# Patient Record
Sex: MALE | Race: WHITE | HISPANIC OR LATINO | URBAN - METROPOLITAN AREA
[De-identification: names, ages, dates, MRNs, and addresses within clinical notes are randomized per-mention and may not be internally consistent; named-entity substitution may affect disease eponyms.]

---
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
## 2019-12-10 NOTE — DISCHARGE NOTE ADULT - THE PATIENT HAS
no difficulties Mercedes Flap Text: The defect edges were debeveled with a #15 scalpel blade.  Given the location of the defect, shape of the defect and the proximity to free margins a Mercedes flap was deemed most appropriate.  Using a sterile surgical marker, an appropriate advancement flap was drawn incorporating the defect and placing the expected incisions within the relaxed skin tension lines where possible. The area thus outlined was incised deep to adipose tissue with a #15 scalpel blade.  The skin margins were undermined to an appropriate distance in all directions utilizing iris scissors.

## 2020-03-31 NOTE — ED STATDOCS - MUSCULOSKELETAL, MLM
range of motion is not limited and there is no muscle tenderness. Patient range of motion is not limited and there is no muscle tenderness. 5/5 strength on flexion and extension of upper and lower limbs.

## 2020-11-14 ENCOUNTER — EMERGENCY (EMERGENCY)
Facility: HOSPITAL | Age: 20
LOS: 0 days | Discharge: HOME | End: 2020-11-14
Attending: EMERGENCY MEDICINE
Payer: MEDICAID

## 2020-11-14 VITALS — WEIGHT: 179.9 LBS | HEIGHT: 68.25 IN

## 2020-11-14 VITALS — HEART RATE: 99 BPM

## 2020-11-14 DIAGNOSIS — U07.1 COVID-19: ICD-10-CM

## 2020-11-14 DIAGNOSIS — R50.9 FEVER, UNSPECIFIED: ICD-10-CM

## 2020-11-14 DIAGNOSIS — R51.9 HEADACHE, UNSPECIFIED: ICD-10-CM

## 2020-11-14 DIAGNOSIS — Z79.4 LONG TERM (CURRENT) USE OF INSULIN: ICD-10-CM

## 2020-11-14 DIAGNOSIS — J02.9 ACUTE PHARYNGITIS, UNSPECIFIED: ICD-10-CM

## 2020-11-14 DIAGNOSIS — E11.9 TYPE 2 DIABETES MELLITUS WITHOUT COMPLICATIONS: ICD-10-CM

## 2020-11-14 DIAGNOSIS — J06.9 ACUTE UPPER RESPIRATORY INFECTION, UNSPECIFIED: ICD-10-CM

## 2020-11-14 PROCEDURE — 99283 EMERGENCY DEPT VISIT LOW MDM: CPT

## 2020-11-14 PROCEDURE — U0003: CPT

## 2020-11-14 NOTE — ED ADULT NURSE NOTE - NSIMPLEMENTINTERV_GEN_ALL_ED
Implemented All Universal Safety Interventions:  [place] to call system. Call bell, personal items and telephone within reach. Instruct patient to call for assistance. Room bathroom lighting operational. Non-slip footwear when patient is off stretcher. Physically safe environment: no spills, clutter or unnecessary equipment. Stretcher in lowest position, wheels locked, appropriate side rails in place.

## 2020-11-14 NOTE — ED ADULT TRIAGE NOTE - CHIEF COMPLAINT QUOTE
Pt presents to Er with c/o of covid symptoms.  States his dad tested positive 2 days ago.  C/O of fever, headache, runny nose and throat soreness.  Denies chest pain, SOB, NVD.

## 2020-11-14 NOTE — ED STATDOCS - CARE PLAN
Principal Discharge DX:	Screening for viral disease   Principal Discharge DX:	URI (upper respiratory infection)

## 2020-11-14 NOTE — ED STATDOCS - NSFOLLOWUPCLINICS_GEN_ALL_ED_FT
[facility]  Family Medicine  [address] [place] [address]  Phone: [phone]  Fax:   Follow Up Time:

## 2020-11-14 NOTE — ED STATDOCS - ATTENDING CONTRIBUTION TO CARE
Attending Contribution to Care: I, Cinthia Oswald, performed the initial face to face bedside interview with this patient regarding history of present illness, review of symptoms and relevant past medical, social and family history.  I completed an independent physical examination.  I was the initial provider who evaluated this patient. I have signed out the follow up of any pending tests (i.e. labs, radiological studies) to the ACP.  I have communicated the patient’s plan of care and disposition with the ACP.

## 2020-11-14 NOTE — ED STATDOCS - PATIENT PORTAL LINK FT
You can access the FollowMyHealth Patient Portal offered by [facility] by registering at the following website: [email or web address]. By joining [employer]’s FollowMyHealth portal, you will also be able to view your health information using other applications (apps) compatible with our system.

## 2020-11-15 PROBLEM — G43.909 MIGRAINE, UNSPECIFIED, NOT INTRACTABLE, WITHOUT STATUS MIGRAINOSUS: Chronic | Status: ACTIVE | Noted: 2017-11-08

## 2020-11-15 LAB — SARS-COV-2 RNA SPEC QL NAA+PROBE: DETECTED

## 2021-04-08 NOTE — ED STATDOCS - OBJECTIVE STATEMENT
Last refill omeprazole 20 mg 10/18/19 #90-0    Last OV 3/3/21    Rx and pharmacy pending per approval   18 y/o M w/ pmhx of migraines, presents to ED c/o thirst, fatigue, urinary frequency x1 week. Also c/o chest pain and abdominal pain since last night. Also states he has been getting cramps in his legs x4 days. This morning pt began vomiting. BGL in . Denies fevers, abd surgeries, or any other acute c/o. As per mother, pt has been losing a lot of weight and his urinary sx and fatigue have been worsening. Non-smoker, non-drinker, denies any other recreational drug use.

## 2021-05-23 ENCOUNTER — EMERGENCY (EMERGENCY)
Facility: HOSPITAL | Age: 21
LOS: 0 days | Discharge: HOME | End: 2021-05-23
Attending: EMERGENCY MEDICINE
Payer: MEDICAID

## 2021-05-23 VITALS
OXYGEN SATURATION: 100 % | RESPIRATION RATE: 18 BRPM | SYSTOLIC BLOOD PRESSURE: 128 MMHG | DIASTOLIC BLOOD PRESSURE: 72 MMHG | HEART RATE: 72 BPM | TEMPERATURE: 99 F

## 2021-05-23 VITALS — HEIGHT: 68.25 IN
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
## 2021-05-23 DIAGNOSIS — E10.9 TYPE 1 DIABETES MELLITUS WITHOUT COMPLICATIONS: ICD-10-CM
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
## 2021-05-23 DIAGNOSIS — M79.641 PAIN IN RIGHT HAND: ICD-10-CM

## 2021-05-23 DIAGNOSIS — S62.396A OTHER FRACTURE OF FIFTH METACARPAL BONE, RIGHT HAND, INITIAL ENCOUNTER FOR CLOSED FRACTURE: ICD-10-CM

## 2021-05-23 DIAGNOSIS — S69.81XA OTHER SPECIFIED INJURIES OF RIGHT WRIST, HAND AND FINGER(S), INITIAL ENCOUNTER: ICD-10-CM
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
## 2021-05-23 PROCEDURE — 99283 EMERGENCY DEPT VISIT LOW MDM: CPT

## 2021-05-23 PROCEDURE — 73130 X-RAY EXAM OF HAND: CPT | Mod: 26,RT

## 2021-05-23 PROCEDURE — 73130 X-RAY EXAM OF HAND: CPT | Mod: RT

## 2021-05-23 RX ORDER — IBUPROFEN 200 MG
800 TABLET ORAL ONCE
Refills: 0 | Status: COMPLETED | OUTPATIENT
Start: 2021-05-23 | End: 2021-05-23

## 2021-05-23 RX ADMIN — Medication 800 MILLIGRAM(S): at 01:53

## 2021-05-23 NOTE — ED ADULT TRIAGE NOTE - CHIEF COMPLAINT QUOTE
Pt presents to ED for right hand pain sp hitting wall. Pt with limited ROM to hand. Swelling noted in ED

## 2021-05-23 NOTE — ED PROVIDER NOTE - MUSCULOSKELETAL, MLM
Spine appears normal, range of motion is limited with difficulty making fist with right hand, +5th metacarpal tenderness of right hand and swelling over dorsum of right hand over 4th and 5th metacarpals;

## 2021-05-23 NOTE — ED PROVIDER NOTE - PATIENT PORTAL LINK FT
You can access the FollowMyHealth Patient Portal offered by [facility] by registering at the following website: [email or web address]. By joining [employer]’s FollowMyHealth portal, you will also be able to view your health information using other applications (apps) compatible with our system.

## 2021-05-23 NOTE — ED PROVIDER NOTE - OBJECTIVE STATEMENT
Pt. is a 19 yo right hand dominant M with a hx of migraines and DM on insulin presenting with right hand pain s/p punching something about 2 hours ago.  Patient states he is now having pain on dorsum of right hand and some swelling. He is having trouble moving his hand and grasping things.  He denies other injury, weakness or numbness.

## 2021-05-24 ENCOUNTER — EMERGENCY (EMERGENCY)
Facility: HOSPITAL | Age: 21
LOS: 0 days | Discharge: HOME | End: 2021-05-24
Attending: HOSPITALIST
Payer: MEDICAID

## 2021-05-24 VITALS — WEIGHT: 169.98 LBS | HEIGHT: 69 IN

## 2021-05-24 VITALS
SYSTOLIC BLOOD PRESSURE: 119 MMHG | RESPIRATION RATE: 18 BRPM | OXYGEN SATURATION: 100 % | HEART RATE: 51 BPM | DIASTOLIC BLOOD PRESSURE: 70 MMHG | TEMPERATURE: 98 F

## 2021-05-24 DIAGNOSIS — E10.9 TYPE 1 DIABETES MELLITUS WITHOUT COMPLICATIONS: ICD-10-CM

## 2021-05-24 DIAGNOSIS — Z86.69 PERSONAL HISTORY OF OTHER DISEASES OF THE NERVOUS SYSTEM AND SENSE ORGANS: ICD-10-CM

## 2021-05-24 DIAGNOSIS — M79.641 PAIN IN RIGHT HAND: ICD-10-CM

## 2021-05-24 DIAGNOSIS — S62.306A UNSPECIFIED FRACTURE OF FIFTH METACARPAL BONE, RIGHT HAND, INITIAL ENCOUNTER FOR CLOSED FRACTURE: ICD-10-CM
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
## 2021-05-24 DIAGNOSIS — Z79.4 LONG TERM (CURRENT) USE OF INSULIN: ICD-10-CM
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
## 2021-05-24 PROCEDURE — 99284 EMERGENCY DEPT VISIT MOD MDM: CPT | Mod: 25

## 2021-05-24 PROCEDURE — 29125 APPL SHORT ARM SPLINT STATIC: CPT | Mod: RT

## 2021-05-24 PROCEDURE — 99283 EMERGENCY DEPT VISIT LOW MDM: CPT | Mod: 25

## 2021-05-24 RX ORDER — IBUPROFEN 200 MG
600 TABLET ORAL ONCE
Refills: 0 | Status: COMPLETED | OUTPATIENT
Start: 2021-05-24 | End: 2021-05-24

## 2021-05-24 RX ADMIN — Medication 600 MILLIGRAM(S): at 17:40

## 2021-05-24 NOTE — ED STATDOCS - CARE PROVIDER_API CALL
Royer Vega)  Orthopaedic Surgery; Surgery of the Hand  [address] [place] [address]  Phone: [phone]  Fax: [phone]  Follow Up Time:     Ady Shin)  Orthopaedic Surgery; Surgery of the Hand  [address], [address]  [place] [address]  Phone: [phone]  Fax: [phone]  Follow Up Time:

## 2021-05-24 NOTE — ED STATDOCS - NS_ ATTENDINGSCRIBEDETAILS _ED_A_ED_FT
Lili Lee MD: The history, relevant review of systems, past medical and surgical history, medical decision making, and physical examination was documented by the scribe in my presence and I attest to the accuracy of the documentation.

## 2021-05-24 NOTE — ED POST DISCHARGE NOTE - DETAILS
Unable to reach patient. Will send letter via [employer]. - Giovanni Schafer PA-C Patient returned call, findings provided. Advised to return to ED for splint. Pt states he will return after work today. - Giovanni Schafer PA-C

## 2021-05-24 NOTE — ED ADULT NURSE NOTE - OBJECTIVE STATEMENT
Patient c/o right hand pain. Pt was seen at [facility] 2 days ago s/p punching a wall with right hand. Had XR and was told was negative and was Dx with a contusion and had hand wrapped in ACE wrap and was discharged home. Pt states was called by hospital today and told may have a right hand fracture and came to the ED. Right hand dominant. No other complaints at this time.

## 2021-05-24 NOTE — ED STATDOCS - PROGRESS NOTE DETAILS
Procedure note documented. Mtang[place]i Np 20 yr. old male presents to ED with fracture of 5th metacarpal after hitting a wall 2 days ago. Seen at ED 2 days ago and called back for revised reading of hand X-Ray. Seen and examined by attending in intake. Plan: Splint ulna gutter and refer to orthopedic. Will F/U with patient. Dalia DON

## 2021-05-24 NOTE — ED STATDOCS - OBJECTIVE STATEMENT
19 y/o male with a PMHx of migraines, DM, presents to the ED c/o right hand pain. Pt was seen at [facility] 2 days ago s/p punching a wall with right hand. Had XR and was told was negative and was Dx with a contusion and had hand wrapped in ACE wrap and was discharged home. Pt states was called by hospital today and told may have a right hand fracture and came to the ED. Right hand dominant. No other complaints at this time.

## 2021-05-24 NOTE — ED STATDOCS - NSFOLLOWUPINSTRUCTIONS_ED_ALL_ED_FT
Boxer's Fracture       A boxer's fracture is a break (fracture) in the bone that connects your little finger to your wrist (fifth metacarpal). This type of fracture usually happens at the end of the bone, closest to the little finger. The knuckle is often pushed down by the impact.      What are the causes?  This condition may be caused by:  •Hitting an object with a clenched fist.      •A hard, direct hit to the hand.      •An injury that crushes the hand.        What increases the risk?  You are more likely to develop this condition if:  •You are in a fistfight.      •You have certain bone diseases, such as osteoporosis.        What are the signs or symptoms?  Symptoms of a boxer's fracture develop immediately after the injury. They may include:  •Pain, which may get worse when your little finger is moved or your hand is touched.      •Bruising around the knuckle.      •Swelling of your hand.      •Your little finger being in an abnormal position.      •The knuckle on the finger looking sunken in.      •Not being able to move the finger.      •A shortened finger.        How is this diagnosed?  This injury may be diagnosed based on:  •Your symptoms. Your health care provider may ask about any recent hand injury.      •A physical exam.      •X-rays to confirm the diagnosis.        How is this treated?  Treatment for this condition depends on how severe the injury is. Possible treatments include:  •Closed reduction. This treatment may be used if your bone is stable. The health care provider uses pressure and rotation to move the bones back into place without cutting your skin open. You would need to wear a cast or splint to help keep your bones in place while they heal.      •Open reduction with internal fixation (ORIF). This may be needed if your fracture is far out of place or goes through the joint surface of the bone or through the skin. This treatment involves open surgery to move your bones back into the right position. Screws, wires, or plates may be used to make the fracture stable.    You may also need to:  •Wear a cast or a splint for several weeks.       •Have follow-up X-rays to make sure that the bone is healing well and staying in position.       •Have physical therapy after your cast or splint is removed. This will help you regain full movement (range of motion) and strength in your hand.        Follow these instructions at home:    If you have a splint:     •Wear the splint as told by your health care provider. Remove it only as told by your health care provider.       •Loosen the splint if your fingers tingle, become numb, or turn cold and blue.      •Keep the splint clean.     •If the splint is not waterproof:  •Do not let it get wet.      •Cover it with a watertight covering when you take a bath or a shower.        If you have a cast:     • Do not stick anything inside the cast to scratch your skin. Doing that increases your risk of infection.      •Check the skin around the cast every day. Tell your health care provider about any concerns.      •You may put lotion on dry skin around the edges of the cast. Do not put lotion on the skin underneath the cast.      •Keep the cast clean.     •If the cast is not waterproof:   •Do not let it get wet.      •Cover it with a watertight covering when you take a bath or a shower.        Managing pain, stiffness, and swelling   •If directed, put ice on the injured area.  •If you have a removable splint, remove it as told by your health care provider.      •Put ice in a plastic bag.      •Place a towel between your skin and the bag or between your cast and the bag.       •Leave the ice on for 20 minutes, 2–3 times a day.        •Move your fingers often to avoid stiffness and to lessen swelling.      •Raise (elevate) the injured hand above the level of your heart while you are sitting or lying down.      Driving     • Do not drive or use heavy machinery while taking prescription pain medicine.      • Do not drive while wearing a cast or splint on your hand.      General instructions     • Do not put pressure on any part of the cast or splint until it is fully hardened. This may take several hours.      • Do not use any products that contain nicotine or tobacco, such as cigarettes and e-cigarettes. These can delay bone healing. If you need help quitting, ask your health care provider.      •Take over-the-counter and prescription medicines only as told by your health care provider.    •If you are taking prescription pain medicine, take actions to prevent or treat constipation. Your health care provider may recommend that you:  •Drink enough fluid to keep your urine pale yellow.       •Eat foods that are high in fiber, such as fresh fruits and vegetables, whole grains, and beans.       •Limit foods that are high in fat and processed sugars, such as fried or sweet foods.       •Take an over-the-counter or prescription medicine for constipation.        •Return to your normal activities as told by your health care provider. Ask your health care provider what activities are safe for you.      •Keep all follow-up visits as told by your health care provider. This is important.        Contact a health care provider if:    •Your pain is getting worse.      •You have more redness, swelling, or pain in the injured area.      •You have a fever.      •Your cast or splint feels too tight or too loose.      •Your cast is coming apart.        Get help right away if:    •You have trouble breathing.      •You have severe pain.      •Your skin or nails on your injured hand turn blue or gray even after you loosen your splint.      •Your injured hand feels cold or becomes numb even after you loosen your splint.      •You develop a rash.        Summary    •A boxer's fracture is a break in the bone that connects your little finger to your wrist (fifth metacarpal).      •You may have an X-ray to confirm the diagnosis.      •Treatment depends on how severe your injury is, and it may include wearing a cast or splint or having surgery.      This information is not intended to replace advice given to you by your health care provider. Make sure you discuss any questions you have with your health care provider.    Rest. Ice compresses and elevate.  Maintain splint until you see a Orthopedic.   Ibuprofen 600 mg. PO every 6 hrs. for pain.  Follow up with Orthopedic.

## 2021-05-24 NOTE — ED ADULT TRIAGE NOTE - CHIEF COMPLAINT QUOTE
Pt a/ox3, reports "I thought I broke my right hand so I came hereon Saturday and they said it was a sprain, and then they sent me home and called me back to tell me they think it is a fracture and that I should come back". pt reports right hand injury s/p punching a wall on Saturday.

## 2021-05-24 NOTE — ED STATDOCS - PATIENT PORTAL LINK FT
You can access the FollowMyHealth Patient Portal offered by [facility] by registering at the following website: [email or web address]. By joining [employer]’s FollowMyHealth portal, you will also be able to view your health information using other applications (apps) compatible with our system.

## 2021-05-25 PROBLEM — E10.9 TYPE 1 DIABETES MELLITUS WITHOUT COMPLICATIONS: Chronic | Status: ACTIVE | Noted: 2021-05-23

## 2021-12-20 ENCOUNTER — EMERGENCY (EMERGENCY)
Facility: HOSPITAL | Age: 21
LOS: 0 days | Discharge: HOME | End: 2021-12-20
Attending: EMERGENCY MEDICINE
Payer: MEDICAID

## 2021-12-20 VITALS
DIASTOLIC BLOOD PRESSURE: 72 MMHG | SYSTOLIC BLOOD PRESSURE: 134 MMHG | OXYGEN SATURATION: 99 % | TEMPERATURE: 98 F | HEART RATE: 72 BPM | RESPIRATION RATE: 17 BRPM

## 2021-12-20 VITALS — HEIGHT: 69 IN | WEIGHT: 179.9 LBS

## 2021-12-20 DIAGNOSIS — S76.811A STRAIN OF OTHER SPECIFIED MUSCLES, FASCIA AND TENDONS AT THIGH LEVEL, RIGHT THIGH, INITIAL ENCOUNTER: ICD-10-CM
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
## 2021-12-20 DIAGNOSIS — N50.819 TESTICULAR PAIN, UNSPECIFIED: ICD-10-CM

## 2021-12-20 DIAGNOSIS — R10.32 LEFT LOWER QUADRANT PAIN: ICD-10-CM

## 2021-12-20 DIAGNOSIS — E10.65 TYPE 1 DIABETES MELLITUS WITH HYPERGLYCEMIA: ICD-10-CM

## 2021-12-20 DIAGNOSIS — Z79.4 LONG TERM (CURRENT) USE OF INSULIN: ICD-10-CM

## 2021-12-20 LAB
ALBUMIN SERPL ELPH-MCNC: 3.9 G/DL — SIGNIFICANT CHANGE UP (ref 3.3–5)
ALP SERPL-CCNC: 141 U/L — HIGH (ref 40–120)
ALT FLD-CCNC: 16 U/L — SIGNIFICANT CHANGE UP (ref 12–78)
ANION GAP SERPL CALC-SCNC: 3 MMOL/L — LOW (ref 5–17)
APPEARANCE UR: CLEAR — SIGNIFICANT CHANGE UP
AST SERPL-CCNC: 16 U/L — SIGNIFICANT CHANGE UP (ref 15–37)
BASOPHILS # BLD AUTO: 0.02 K/UL — SIGNIFICANT CHANGE UP (ref 0–0.2)
BASOPHILS NFR BLD AUTO: 0.3 % — SIGNIFICANT CHANGE UP (ref 0–2)
BILIRUB SERPL-MCNC: 0.4 MG/DL — SIGNIFICANT CHANGE UP (ref 0.2–1.2)
BILIRUB UR-MCNC: NEGATIVE — SIGNIFICANT CHANGE UP
BUN SERPL-MCNC: 17 MG/DL — SIGNIFICANT CHANGE UP (ref 7–23)
CALCIUM SERPL-MCNC: 9.3 MG/DL — SIGNIFICANT CHANGE UP (ref 8.5–10.1)
CHLORIDE SERPL-SCNC: 100 MMOL/L — SIGNIFICANT CHANGE UP (ref 96–108)
CO2 SERPL-SCNC: 29 MMOL/L — SIGNIFICANT CHANGE UP (ref 22–31)
COLOR SPEC: YELLOW — SIGNIFICANT CHANGE UP
CREAT SERPL-MCNC: 1.29 MG/DL — SIGNIFICANT CHANGE UP (ref 0.5–1.3)
DIFF PNL FLD: NEGATIVE — SIGNIFICANT CHANGE UP
EOSINOPHIL # BLD AUTO: 0.04 K/UL — SIGNIFICANT CHANGE UP (ref 0–0.5)
EOSINOPHIL NFR BLD AUTO: 0.7 % — SIGNIFICANT CHANGE UP (ref 0–6)
GLUCOSE SERPL-MCNC: 530 MG/DL — CRITICAL HIGH (ref 70–99)
GLUCOSE UR QL: 1000 MG/DL
HCT VFR BLD CALC: 44.8 % — SIGNIFICANT CHANGE UP (ref 39–50)
HGB BLD-MCNC: 16.1 G/DL — SIGNIFICANT CHANGE UP (ref 13–17)
IMM GRANULOCYTES NFR BLD AUTO: 0.3 % — SIGNIFICANT CHANGE UP (ref 0–1.5)
KETONES UR-MCNC: NEGATIVE — SIGNIFICANT CHANGE UP
LEUKOCYTE ESTERASE UR-ACNC: NEGATIVE — SIGNIFICANT CHANGE UP
LYMPHOCYTES # BLD AUTO: 2.67 K/UL — SIGNIFICANT CHANGE UP (ref 1–3.3)
LYMPHOCYTES # BLD AUTO: 45.7 % — HIGH (ref 13–44)
MCHC RBC-ENTMCNC: 32.1 PG — SIGNIFICANT CHANGE UP (ref 27–34)
MCHC RBC-ENTMCNC: 35.9 GM/DL — SIGNIFICANT CHANGE UP (ref 32–36)
MCV RBC AUTO: 89.4 FL — SIGNIFICANT CHANGE UP (ref 80–100)
MONOCYTES # BLD AUTO: 0.31 K/UL — SIGNIFICANT CHANGE UP (ref 0–0.9)
MONOCYTES NFR BLD AUTO: 5.3 % — SIGNIFICANT CHANGE UP (ref 2–14)
NEUTROPHILS # BLD AUTO: 2.78 K/UL — SIGNIFICANT CHANGE UP (ref 1.8–7.4)
NEUTROPHILS NFR BLD AUTO: 47.7 % — SIGNIFICANT CHANGE UP (ref 43–77)
NITRITE UR-MCNC: NEGATIVE — SIGNIFICANT CHANGE UP
PH UR: 6.5 — SIGNIFICANT CHANGE UP (ref 5–8)
PLATELET # BLD AUTO: 239 K/UL — SIGNIFICANT CHANGE UP (ref 150–400)
POTASSIUM SERPL-MCNC: 4.3 MMOL/L — SIGNIFICANT CHANGE UP (ref 3.5–5.3)
POTASSIUM SERPL-SCNC: 4.3 MMOL/L — SIGNIFICANT CHANGE UP (ref 3.5–5.3)
PROT SERPL-MCNC: 7.7 GM/DL — SIGNIFICANT CHANGE UP (ref 6–8.3)
PROT UR-MCNC: NEGATIVE MG/DL — SIGNIFICANT CHANGE UP
RBC # BLD: 5.01 M/UL — SIGNIFICANT CHANGE UP (ref 4.2–5.8)
RBC # FLD: 11.6 % — SIGNIFICANT CHANGE UP (ref 10.3–14.5)
SODIUM SERPL-SCNC: 132 MMOL/L — LOW (ref 135–145)
SP GR SPEC: 1.01 — SIGNIFICANT CHANGE UP (ref 1.01–1.02)
UROBILINOGEN FLD QL: NEGATIVE MG/DL — SIGNIFICANT CHANGE UP
WBC # BLD: 5.84 K/UL — SIGNIFICANT CHANGE UP (ref 3.8–10.5)
WBC # FLD AUTO: 5.84 K/UL — SIGNIFICANT CHANGE UP (ref 3.8–10.5)

## 2021-12-20 PROCEDURE — 99284 EMERGENCY DEPT VISIT MOD MDM: CPT | Mod: 25

## 2021-12-20 PROCEDURE — 76870 US EXAM SCROTUM: CPT

## 2021-12-20 PROCEDURE — 93975 VASCULAR STUDY: CPT | Mod: 26

## 2021-12-20 PROCEDURE — 96374 THER/PROPH/DIAG INJ IV PUSH: CPT

## 2021-12-20 PROCEDURE — 99285 EMERGENCY DEPT VISIT HI MDM: CPT

## 2021-12-20 PROCEDURE — 74177 CT ABD & PELVIS W/CONTRAST: CPT | Mod: 26,MA

## 2021-12-20 PROCEDURE — 85025 COMPLETE CBC W/AUTO DIFF WBC: CPT

## 2021-12-20 PROCEDURE — 82962 GLUCOSE BLOOD TEST: CPT

## 2021-12-20 PROCEDURE — 96375 TX/PRO/DX INJ NEW DRUG ADDON: CPT

## 2021-12-20 PROCEDURE — 80053 COMPREHEN METABOLIC PANEL: CPT

## 2021-12-20 PROCEDURE — 36415 COLL VENOUS BLD VENIPUNCTURE: CPT

## 2021-12-20 PROCEDURE — 76870 US EXAM SCROTUM: CPT | Mod: 26

## 2021-12-20 PROCEDURE — 81003 URINALYSIS AUTO W/O SCOPE: CPT

## 2021-12-20 PROCEDURE — 74177 CT ABD & PELVIS W/CONTRAST: CPT | Mod: MA

## 2021-12-20 PROCEDURE — 93975 VASCULAR STUDY: CPT

## 2021-12-20 RX ORDER — INSULIN LISPRO 100/ML
6 VIAL (ML) SUBCUTANEOUS ONCE
Refills: 0 | Status: COMPLETED | OUTPATIENT
Start: 2021-12-20 | End: 2021-12-20

## 2021-12-20 RX ORDER — IBUPROFEN 200 MG
1 TABLET ORAL
Qty: 12 | Refills: 0
Start: 2021-12-20

## 2021-12-20 RX ORDER — INSULIN HUMAN 100 [IU]/ML
8 INJECTION, SOLUTION SUBCUTANEOUS ONCE
Refills: 0 | Status: DISCONTINUED | OUTPATIENT
Start: 2021-12-20 | End: 2021-12-20

## 2021-12-20 RX ORDER — INSULIN HUMAN 100 [IU]/ML
4 INJECTION, SOLUTION SUBCUTANEOUS ONCE
Refills: 0 | Status: COMPLETED | OUTPATIENT
Start: 2021-12-20 | End: 2021-12-20

## 2021-12-20 RX ORDER — KETOROLAC TROMETHAMINE 30 MG/ML
30 SYRINGE (ML) INJECTION ONCE
Refills: 0 | Status: DISCONTINUED | OUTPATIENT
Start: 2021-12-20 | End: 2021-12-20

## 2021-12-20 RX ORDER — SODIUM CHLORIDE 9 MG/ML
1000 INJECTION INTRAMUSCULAR; INTRAVENOUS; SUBCUTANEOUS ONCE
Refills: 0 | Status: COMPLETED | OUTPATIENT
Start: 2021-12-20 | End: 2021-12-20

## 2021-12-20 RX ADMIN — Medication 30 MILLIGRAM(S): at 04:10

## 2021-12-20 RX ADMIN — INSULIN HUMAN 4 UNIT(S): 100 INJECTION, SOLUTION SUBCUTANEOUS at 06:17

## 2021-12-20 RX ADMIN — Medication 30 MILLIGRAM(S): at 03:37

## 2021-12-20 RX ADMIN — SODIUM CHLORIDE 1000 MILLILITER(S): 9 INJECTION INTRAMUSCULAR; INTRAVENOUS; SUBCUTANEOUS at 06:17

## 2021-12-20 NOTE — ED ADULT TRIAGE NOTE - CHIEF COMPLAINT QUOTE
patient c/o worsening groin pain x 1 week.  reports pain radiates from left testicle up to left groin area.  notes heavy lifting in gym recently.  denies urinary sx.

## 2021-12-20 NOTE — ED PROVIDER NOTE - CARE PLAN
1 Principal Discharge DX:	Strain of groin, left, initial encounter  Secondary Diagnosis:	Diabetes mellitus with hyperglycemia

## 2021-12-20 NOTE — ED PROVIDER NOTE - GASTROINTESTINAL, MLM
Abdomen soft, non-tender, no guarding.  +tenderness without hernia palpated along left inguinal ligament.

## 2021-12-20 NOTE — ED PROVIDER NOTE - PATIENT PORTAL LINK FT
You can access the FollowMyHealth Patient Portal offered by [facility] by registering at the following website: [email or web address]. By joining [email or web address]’s FollowMyHealth portal, you will also be able to view your health information using other applications (apps) compatible with our system. You can access the FollowMyHealth Patient Portal offered by [facility] by registering at the following website: [email or web address]. By joining [employer]’s FollowMyHealth portal, you will also be able to view your health information using other applications (apps) compatible with our system.

## 2021-12-20 NOTE — ED PROVIDER NOTE - PROGRESS NOTE DETAILS
Groin strain/pulled muscle likely.  Ibuprofen 800 sent to pharmacy.  Patient to follow up with his doctor.  Understands he had hyperglycemia and will check glucose at home and continue to cover with insulin.  Patient will rest and take NSAIDs for pain. Patient still with glucose in 400s.  WIll give 1L NSS and regular insulin IV 4u Attending [place], received sign out from Dr. Oswald.  Pt BS improved to 319.  d/w pt importance to follow up with endocrine today (pt has appt for mid jan) and to take medications as prescribed.  Follow up with PMD for groin pan

## 2021-12-20 NOTE — ED PROVIDER NOTE - OBJECTIVE STATEMENT
Patient is a 20 yo M with PMHx of DM presents with left groin pain X 1 week.  Patient states he has been going to the gym a lot and doesn't know if he strained his groin.  States pain is worse when standing or walking. Denies dysuria, hematuria or penile discharge.  Having normal bowel movements.  No fever.  No vomiting.  Patient had similar pain years ago after heavy lifting.  Worried he has a hernia.  Pain is mostly in left groin and upper testicular region.  No direct trauma to groin or testicles.

## 2021-12-20 NOTE — ED PROVIDER NOTE - NSFOLLOWUPINSTRUCTIONS_ED_ALL_ED_FT
Muscle Strain    WHAT YOU NEED TO KNOW:    A muscle strain is a twist, pull, or tear of a muscle or tendon. A tendon is a strong elastic tissue that connects a muscle to a bone. Signs of a strained muscle include bruising and swelling over the area, pain with movement, and loss of strength.          DISCHARGE INSTRUCTIONS:    Return to the emergency department if:     You suddenly cannot feel or move your injured muscle.        Contact your healthcare provider if:     Your pain and swelling worsen or do not go away.       You have questions or concerns about your condition or care.    Medicines:     NSAIDs help decrease swelling and pain or fever. This medicine is available with or without a doctor's order. NSAIDs can cause stomach bleeding or kidney problems in certain people. If you take blood thinner medicine, always ask your healthcare provider if NSAIDs are safe for you. Always read the medicine label and follow directions.      Muscle relaxers help decrease pain and muscle spasms.      Take your medicine as directed. Contact your healthcare provider if you think your medicine is not helping or if you have side effects. Tell him of her if you are allergic to any medicine. Keep a list of the medicines, vitamins, and herbs you take. Include the amounts, and when and why you take them. Bring the list or the pill bottles to follow-up visits. Carry your medicine list with you in case of an emergency.    Follow up with your healthcare provider as directed: Your healthcare provider may suggest that you have a follow-up visit before you go back to your usual activity. Write down your questions so you remember to ask them during your visits.    Self-care:     3 to 7 days after the injury: Use Rest, Ice, Compression, and Elevation (RICE) to help stop bruising and decrease pain and swelling.  Rest: Rest your muscle to allow your injury to heal. When the pain decreases, begin normal, slow movements. For mild and moderate muscle strains, you should rest your muscles for about 2 days. However, if you have a severe muscle strain, you should rest for 10 to 14 days. You may need to use crutches to walk if your muscle strain is in your legs or lower body.       Ice: Put an ice pack on the injured area. Put a towel between the ice pack and your skin. Do not put the ice pack directly on your skin. You can use a package of frozen peas instead of an ice pack.      Compression: You may need to wrap an elastic bandage around the area to decrease swelling. It should be tight enough for you to feel support. Do not wrap it too tightly.       Elevation: Keep the injured muscle raised above your heart if possible. For example if you have a strain of your lower leg muscle, lie down and prop your leg up on pillows. This helps decrease pain and swelling.      3 to 21 days after the injury: Start to slowly and regularly exercise your muscle. This will help it heal. If you feel pain, decrease how hard you are exercising.       1 to 6 weeks after the injury: Stretch the injured muscle. Hold the stretch for about 30 seconds. Do this 4 times a day. You may stretch the muscle until you feel a slight pull. Stop stretching if you feel pain.       2 weeks to 6 months after the injury: The goal of this phase is to return to the activity you were doing before the injury happened, without hurting the muscle again.       3 weeks to 6 months after the injury: Keep stretching and strengthening your muscles to avoid injury. Slowly increase the time and distance that you exercise. You may have signs and symptoms of muscle strain 6 months after the injury, even if you do things to help it heal. In this case, you may need surgery on the muscle. Please call and follow up with your endocrinologist and primary care doctor in 1-3 days.  Please call [phone] to find doctors in [facility].    Use Tylenol (acetaminophen) 1000mg every 6 hours and Motrin (Ibuprofen) 600 mg every 6 hours as need for fever/pain.      Muscle Strain    WHAT YOU NEED TO KNOW:    A muscle strain is a twist, pull, or tear of a muscle or tendon. A tendon is a strong elastic tissue that connects a muscle to a bone. Signs of a strained muscle include bruising and swelling over the area, pain with movement, and loss of strength.          DISCHARGE INSTRUCTIONS:    Return to the emergency department if:     You suddenly cannot feel or move your injured muscle.        Contact your healthcare provider if:     Your pain and swelling worsen or do not go away.       You have questions or concerns about your condition or care.    Medicines:     NSAIDs help decrease swelling and pain or fever. This medicine is available with or without a doctor's order. NSAIDs can cause stomach bleeding or kidney problems in certain people. If you take blood thinner medicine, always ask your healthcare provider if NSAIDs are safe for you. Always read the medicine label and follow directions.      Muscle relaxers help decrease pain and muscle spasms.      Take your medicine as directed. Contact your healthcare provider if you think your medicine is not helping or if you have side effects. Tell him of her if you are allergic to any medicine. Keep a list of the medicines, vitamins, and herbs you take. Include the amounts, and when and why you take them. Bring the list or the pill bottles to follow-up visits. Carry your medicine list with you in case of an emergency.    Follow up with your healthcare provider as directed: Your healthcare provider may suggest that you have a follow-up visit before you go back to your usual activity. Write down your questions so you remember to ask them during your visits.    Self-care:     3 to 7 days after the injury: Use Rest, Ice, Compression, and Elevation (RICE) to help stop bruising and decrease pain and swelling.  Rest: Rest your muscle to allow your injury to heal. When the pain decreases, begin normal, slow movements. For mild and moderate muscle strains, you should rest your muscles for about 2 days. However, if you have a severe muscle strain, you should rest for 10 to 14 days. You may need to use crutches to walk if your muscle strain is in your legs or lower body.       Ice: Put an ice pack on the injured area. Put a towel between the ice pack and your skin. Do not put the ice pack directly on your skin. You can use a package of frozen peas instead of an ice pack.      Compression: You may need to wrap an elastic bandage around the area to decrease swelling. It should be tight enough for you to feel support. Do not wrap it too tightly.       Elevation: Keep the injured muscle raised above your heart if possible. For example if you have a strain of your lower leg muscle, lie down and prop your leg up on pillows. This helps decrease pain and swelling.      3 to 21 days after the injury: Start to slowly and regularly exercise your muscle. This will help it heal. If you feel pain, decrease how hard you are exercising.       1 to 6 weeks after the injury: Stretch the injured muscle. Hold the stretch for about 30 seconds. Do this 4 times a day. You may stretch the muscle until you feel a slight pull. Stop stretching if you feel pain.       2 weeks to 6 months after the injury: The goal of this phase is to return to the activity you were doing before the injury happened, without hurting the muscle again.       3 weeks to 6 months after the injury: Keep stretching and strengthening your muscles to avoid injury. Slowly increase the time and distance that you exercise. You may have signs and symptoms of muscle strain 6 months after the injury, even if you do things to help it heal. In this case, you may need surgery on the muscle.      Diabetic Hyperglycemia    WHAT YOU NEED TO KNOW:    What is diabetic hyperglycemia? Diabetic hyperglycemia is a blood glucose (sugar) level that is higher than your diabetes care team provider recommends. You may not have any signs and symptoms. You may have increased thirst and urinate more often than usual.     What increases my risk for diabetic hyperglycemia?   •You do not follow your meal plan.       •You exercise less than usual.       •You do not take your insulin or diabetes medicine as directed.       •You have an illness, such as a cold, the flu, or pneumonia.       •You have an increased amount of stress.      Why is it important to manage diabetic hyperglycemia? Over time, hyperglycemia can damage your nerves, blood vessels, tissues, and organs. Damage to arteries may increase your risk for heart attack and stroke. Nerve damage may also lead to other heart, stomach, and nerve problems. If diabetic hyperglycemia is not controlled, it can lead to diabetic ketoacidosis (DKA) or hyperglycemic hyperosmolar state (HHS). These are serious conditions that can become life-threatening.    How do I manage diabetic hyperglycemia?   •If you take diabetes medicine or insulin, take it as directed. Missed or wrong doses can cause your blood sugar to go up.       •Tell your care team provider if you continue to have trouble managing your blood sugar. He or she may change the type, amount, or timing of your diabetes medicine or insulin. If you do not take diabetes medicine or insulin, you may need to start.      •Work with your care team provider to develop a sick day plan. Illness can cause your blood sugar to rise. A sick day plan helps you control your blood sugar level when you are sick.       How do I prevent diabetic hyperglycemia?   •Check your blood sugar levels regularly. Ask your care team provider how often to check your blood sugar and what your levels should be.       •Follow your meal plan. Your blood sugar can go up if you eat a large meal or you eat more carbohydrates than recommended. Work with a dietitian to develop a meal plan that is right for you.       •Get physical activity as directed. Physical activity, such as exercise, can help lower your blood sugar when it is high. It also can keep your blood sugar levels steady over time. Be active for at least 30 minutes, 5 days a week. Include muscle strengthening activities 2 days each week. Do not sit for longer than 30 minutes at a time. Work with your care team provider to create an activity plan. Children should get at least 60 minutes of physical activity each day.       •Check your ketones before exercise if your blood sugar level is above 240 mg/dL. Do not exercise if you have ketones in your urine because your blood sugar level may rise even more. Ask your care team provider how to lower your blood sugar when you have ketones.       Call your local emergency number (911 in the [place]) for any of the following:   •You have a seizure.       •You begin to breathe fast or are short of breath.       •You become weak and confused.       When should I seek immediate care?   •Your blood sugar level is over 240 mg/dL and you have ketones in your urine.      •Your breath smells fruity.       •You have nausea and are vomiting.       •You have symptoms of dehydration, such as dark yellow urine, dry mouth and lips, and dry skin.       When should I call my care team provider?   •You continue to have higher blood sugar levels than your care team provider recommends.      •You have questions or concerns about your condition or care.       CARE AGREEMENT:    You have the right to help plan your care. Learn about your health condition and how it may be treated. Discuss treatment options with your healthcare providers to decide what care you want to receive. You always have the right to refuse treatment.

## 2022-01-12 NOTE — ED PROCEDURE NOTE - CPROC ED POST PROC CARE GUIDE1
Verbal/written post procedure instructions were given to patient/caregiver./Elevate the injured extremity as instructed. [FreeTextEntry1] : see assessment.\par patient to have all reports sent to our office for review and record keeping.\par notify office if worsening/persistent symptoms or new onset complaints/concerns, in the event of any emergency or life threatening condition, go to the nearest emergency department and then notify office. \par patient verbalized understanding and agrees with plan of care.

## 2022-02-11 NOTE — ED ADULT NURSE NOTE - OBJECTIVE STATEMENT
Future appt:    Last Appointment with provider:   10/5/2021  Last appointment at [facility]:  11/18/2021 with shayla for [address]  Cholesterol, Total (mg/dL)   Date Value   05/27/2021 185     HDL Cholesterol (mg/dL)   Date Value   05/27/2021 55     LDL Cholesterol (mg/dL)   Date Value   05/27/2021 95     Triglycerides (mg/dL)   Date Value   05/27/2021 176 (H)     Lab Results   Component Value Date     07/21/2020    A1C 5.2 07/21/2020     Lab Results   Component Value Date    T4F 1.0 06/03/2020    TSH 0.749 01/08/2021       No follow-ups on file.   Last fill 10/5/21 Pt A&Ox4, presents to the ED c/o left groin pain x 1 week. Pt thinks he may have a strain after doing pull ups with a weighted belt at the gym, but did not feel pain until the next day. Pt denies swelling or difficulty urinating. Last took Tylenol yesterday.

## 2023-02-24 NOTE — ED ADULT NURSE NOTE - PRIMARY CARE PROVIDER
unknown
on the discharge service for the patient. I have reviewed and made amendments to the documentation where necessary.

## 2023-04-26 NOTE — ED STATDOCS - CHPI ED SEVERITY
MILD Tazorac Pregnancy And Lactation Text: This medication is not safe during pregnancy. It is unknown if this medication is excreted in breast milk.

## 2023-11-02 NOTE — DISCHARGE NOTE ADULT - REASON FOR ADMISSION
Chief Complaint  Arcelia Quintero is a 51 y.o.  female presenting for Colposcopy    History of Present Illness  Arcelia is a 50yo woman, , here for a colposcopy of the cervix.  Pap smear 10/18/23:  ASCUS with + HR HPV pool.  (Neg for types 16, 18/45)  She has had a past medical history of an abnormal Pap smear.  She cannot recall if she had a colposcopy or not.    She does not believe she has had any treatment for an abnormal Pap  She is postmenopausal.      The following portions of the patient's history were reviewed and updated as appropriate: allergies, current medications, past family history, past medical history, past social history, past surgical history, and problem list.    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 (Patient taking differently: Take 0.5 tablets by mouth 2 (Two) Times a Day With Meals.), Disp: 180 tablet, Rfl: 1    Dulera 200-5 MCG/ACT inhaler, INHALE TWO PUFFS BY MOUTH TWICE A DAY, Disp: 18 g, Rfl: 2    FLUoxetine (PROzac) 20 MG capsule, Take 1 capsule by mouth Daily., Disp: , Rfl:     lactulose (CHRONULAC) 10 GM/15ML solution, Take 45 mL by mouth 2 (Two) Times a Day., Disp: , Rfl:     linaclotide (Linzess) 145 MCG capsule capsule, Take 1 capsule by mouth Every Morning Before Breakfast., Disp: 90 capsule, Rfl: 3    mirtazapine (REMERON) 7.5 MG tablet, Take 1 tablet by mouth Every Night., Disp: 30 tablet, Rfl: 1    pantoprazole (PROTONIX) 40 MG EC tablet, Take 1 tablet by mouth Daily., Disp: 30 tablet, Rfl: 3    rizatriptan (Maxalt) 10 MG tablet, Take 1 tablet at onset of headache.  May repeat once  in 2 hours.  Do not take more than 2 tabs a day or 8 tabs a month, Disp: 8 tablet, Rfl: 3    Spiriva Respimat 2.5 MCG/ACT aerosol solution inhaler, Inhale 2 puffs Daily., Disp: , Rfl:     topiramate (TOPAMAX) 25 MG tablet, TAKE ONE TABLET BY MOUTH ONCE NIGHTLY FOR 7 DAYS THEN TAKE 1 TABLET BY MOUTH TWICE A DAY FOR 7 DAYS THEN TAKE ONE TABLET BY MOUTH EVERY MORNING AND 2 TABLETS AT NIGHT FOR 7 DAYS, Disp: 42 tablet, Rfl: 3    nicotine (NICOTROL) 10 MG inhaler, Inhale 1 puff Every 2 (Two) Hours. (Patient not taking: Reported on 11/2/2023), Disp: 168 each, Rfl: 1    Past Medical History:   Diagnosis Date    Abnormal Pap smear of cervix     Allergic rhinitis     Asthma     Atypical chest pain     Brain tumor     Status post brain tumor resection in 1989.    Candidiasis of mouth     Cervical high risk HPV (human papillomavirus) test positive 09/01/2020    CKD (chronic kidney disease), stage III     Colon polyps 09/09/2020    sessile and tubular adenoma    Conversion disorder     COPD (chronic obstructive pulmonary disease)     COVID-19     Elevated liver enzymes     Former smoker 07/05/2023    H/O Helicobacter infection      Status post EGD 9/4/2012, pathology revealed H. pylori infection.    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     Neurology evaluation thought it was numbness from carpal tunnel and not Guillain-Chloride    Hyperlipidemia     Hypertension     Influenza     Internal hemorrhoids     Low grade squamous intraepith lesion on cytologic smear cervix (lgsil) 09/01/2020    Median neuropathy     Migraine     PTSD (post-traumatic stress disorder)     Seizures     2 seizures after surgery    Small fiber neuropathy     Stroke     Tinea corporis         Past Surgical History:   Procedure Laterality Date    BRAIN SURGERY      status post brain tumor resection    COLONOSCOPY      polyp removal    LIVER BIOPSY      UPPER GASTROINTESTINAL ENDOSCOPY      Status post EGD  9/4/2012, pathology revealed H. pylori infection.       Objective  /82 (BP Location: Left arm, Patient Position: Sitting, Cuff Size: Adult)   Resp 16   Wt 60.8 kg (134 lb)   LMP  (LMP Unknown)   BMI 21.64 kg/m²     Physical Exam  Vitals and nursing note reviewed.   Neurological:      Mental Status: She is oriented to person, place, and time.         Assessment/Plan   Diagnoses and all orders for this visit:    1. ASCUS with positive high risk HPV cervical (Primary)  -     TISSUE EXAM, [facility] ([facility]); Future  -     TISSUE EXAM, [facility] ([facility])    Other orders  -     Colposcopy        Colposcopy    Date/Time: 11/2/2023 4:52 PM    Performed by: Mary Lr APRN  Authorized by: Mary Lr APRN  Local anesthesia used: no    Anesthesia:  Local anesthesia used: no    Sedation:  Patient sedated: no    Comments: We had a lengthy discussion before procedure.  We discussed the degree of abnormality of the Pap smear, stages of abnormal cells, HPV, colposcopy, ECC, and biopsy procedure, and the risk of progression versus regression of the lesion.  I am unable to view all 360 degress of the current SCJ.  There is minimal AW lesion on the ectocx.  ECC taken.  Bx at 12:00 and 5:00.  Favor mild dysplasia.  Quick hemostasis with silver nitrate.  She had not eaten at all today, and had a bit of a vagal response, with some nausea.  The symptoms resolved after eating some Nabs & drinking water.  She left the office in good condition.  She was able to verbalize good understanding of aftercare instructions / avoiding SA for one week.  Impression: mild dysplasia.      40 to 64: Counseling/Anticipatory Guidance Discussed: Abn pap, colpo procedure, aftercare instructions.    Return for Next scheduled follow up.    LEANN Hyde  11/02/2023   weakness and weight loss

## 2024-04-22 NOTE — ED STATDOCS - SCRIBE NAME
Goal Outcome Evaluation:         VS: BP: 128/72  Temp: 98.1  HR: 77  RR: 19   O2: SpO2: 97 % client on RA   Output: Continent BB   Last BM: 4/21/24   Activity: A1 with walker GB   Skin: Wound posterior neck and cervical spine   Pain: Declined   CMS:  Neuro: Alert and oriented X4. Abler to make needs known to staff. Denies CP,NV,SOB.   Dressing: Wound posterior neck and cervical spine CDI   Diet: Regular diet,meds whole thin liquids.   LDA: PICC SL RA   Equipment: IV pole call light,walker   Plan: Con't POC.    Additional Info: IV antibiotics administered X1 during shift. Care tolerated well.               Patient's most recent vital signs are:     Vital signs:  BP: 128/72  Temp: 98.1  HR: 77  RR: 19  SpO2: 97 %     Patient does not have new respiratory symptoms.  Patient does not have new sore throat.  Patient does not have a fever greater than 99.5.                  Adela Ibrahim

## 2025-06-13 NOTE — PATIENT PROFILE ADULT. - NSCAFFEAMTFREQ_GEN_ALL_CORE_SD
June 13, 2025       Marjorie Sorto MD  [address] [facility] [address]  [facility] [address]  Via In Basket      Patient: Luis Freeman   YOB: 2001   Date of Visit: 6/13/2025       Dear Dr. Sorto:    Thank you for referring Luis Freeman to me for evaluation. Below are my notes for this visit with her.    If you have questions, please do not hesitate to call me. I look forward to following your patient along with you.      Sincerely,        Gigi Charles, DO        CC: No Recipients    occasional use

## 2025-07-01 NOTE — ED STATDOCS - OBJECTIVE STATEMENT
"  History     Chief Complaint   Patient presents with    Syncope    Hypotension     The history is provided by the patient and medical records.     Christiano Metcalf is a 54 year old male who presents to the ER today via ambulance from home. Patient was carrying groceries when he became light headed, slumped over and had an LOC. Neighbor watched him fall. First responders found patient unresponsive, breathing, sweaty and clammy. He came to after 2-3 min and was alert, orientated. EMS found patient to be hypotensive, b/p 80/70, they gave an IVF bolus. Arrived alert, orientated. Blood sugar 117 on scene. EKG non-diagnostic.    Pt reports chest pain which he reports is \"there all the time and feels like gastric reflux\" and \"chronic diarrhea\".  Reports a slight headache. History neck fusion, no right sided neck pain today.    Denies seizure history. Denies drug, alcohol use.Denies shortness of breath. Denies recent illness, blood in stool.     Patient has a past medical history of learning disability, asthma, hypertension, obesity, type 2 diabetes    Allergies:  Allergies   Allergen Reactions    Food Hives and Swelling     Peas and Spam    Metformin Diarrhea    Nsaids Other (See Comments)     Avoids d/t hx GIB    Ibuprofen GI Disturbance    Seasonal Allergies Difficulty breathing    Ventolin [Albuterol] Palpitations     Does OK with ProAir or Proventil, but NOT Ventolin       Problem List:    Patient Active Problem List    Diagnosis Date Noted    Diabetes mellitus, type 2 (H) 11/30/2023     Priority: Medium    Benign essential hypertension 05/13/2022     Priority: Medium    Morbid obesity (H) 05/13/2022     Priority: Medium    Psoriasis 09/04/2021     Priority: Medium    Mild intermittent asthma without complication 08/31/2018     Priority: Medium    Gout 02/01/2018     Priority: Medium    Learning disability 06/14/2011     Priority: Medium        Past Medical History:    Past Medical History:   Diagnosis Date    Gout  "    Hypomagnesemia     Pain in left knee     Personal history of other (healed) physical injury and trauma     Unspecified injury of unspecified wrist, hand and finger(s), initial encounter        Past Surgical History:    Past Surgical History:   Procedure Laterality Date    ARTHROSCOPY KNEE      Right knee meniscal repair, arthroscopic ally    ARTHROSCOPY KNEE      2010,Left knee scope    COLONOSCOPY N/A 09/30/2021    follow up 5 years family history, 9/30/2021    ESOPHAGOSCOPY, GASTROSCOPY, DUODENOSCOPY (EGD), COMBINED N/A 09/30/2021    Procedure: ESOPHAGOGASTRODUODENOSCOPY, WITH BIOPSY;  Surgeon: Jamel Kruger MD;  Location: [facility]    FINGER SURGERY      Right thumb ORIF    OTHER SURGICAL HISTORY      7/15/14,,HERNIA REPAIR,Left,LIH with mesh    OTHER SURGICAL HISTORY      5/10/16,,HERNIA REPAIR,Right,RIH with plug/patch    SHOULDER ARTHROSCOPY W/ ROTATOR CUFF REPAIR Left 05/2024    tracheal stricture repair  07/2024       Family History:    Family History   Problem Relation Age of Onset    Diabetes Mother         Diabetes    Other - See Comments Father         episode of gout.    Colon Cancer Father         Cancer-colon,diagnosed in early 60s    Family History Negative Son         Good Health,1997    Family History Negative Daughter         Good Health,2009    Family History Negative Son         Good Health,2011       Social History:  Marital Status:  Single [1]  Social History     Tobacco Use    Smoking status: Former     Current packs/day: 0.00     Types: Cigarettes     Quit date: 2/17/2010     Years since quitting: 15.3    Smokeless tobacco: Never   Vaping Use    Vaping status: Never Used   Substance Use Topics    Alcohol use: Never    Drug use: No        Medications:    acetaminophen (TYLENOL) 650 MG CR tablet  albuterol (PROAIR HFA/PROVENTIL HFA/VENTOLIN HFA) 108 (90 Base) MCG/ACT inhaler  allopurinol (ZYLOPRIM) 300 MG tablet  amLODIPine (NORVASC) 2.5 MG tablet  atorvastatin (LIPITOR) 40 MG  "tablet  benzonatate (TESSALON) 200 MG capsule  cetirizine (ZYRTEC) 10 MG tablet  Continuous Glucose  (DEXCOM G7 ) JEFFERSON  Continuous Glucose Sensor (DEXCOM G7 SENSOR) MISC  fluocinonide (LIDEX) 0.05 % external solution  gabapentin (NEURONTIN) 600 MG tablet  HADLIMA PUSHTOUCH 40 MG/0.4ML auto-injector kit  hydrocortisone 2.5 % cream  lisinopril (ZESTRIL) 20 MG tablet  pantoprazole (PROTONIX) 20 MG EC tablet  sertraline (ZOLOFT) 100 MG tablet  SV MELATONIN 3 MG TBDP  topiramate (TOPAMAX) 25 MG tablet  traZODone (DESYREL) 50 MG tablet          Review of Systems   Constitutional:  Positive for diaphoresis.   Cardiovascular:  Positive for chest pain.   Neurological:  Positive for syncope.   All other systems reviewed and are negative.      Physical Exam   BP: (!) 84/57  Pulse: 81  Temp: 98.3  F (36.8  C)  Resp: 18  Height: 175.3 cm (5' 9\")  Weight: 108.4 kg (239 lb)  SpO2: (!) 89 %  Lying Orthostatic BP: 91/56  Lying Orthostatic Pulse: 62 bpm  Sitting Orthostatic BP: 81/58  Sitting Orthostatic Pulse: 66 bpm  Standing Orthostatic BP: 79/50  Standing Orthostatic Pulse: 80 bpm      Physical Exam  Vitals and nursing note reviewed.   Constitutional:       General: He is not in acute distress.     Appearance: He is not ill-appearing or toxic-appearing.   HENT:      Head: Normocephalic.      Right Ear: Tympanic membrane normal.      Left Ear: Tympanic membrane normal.      Nose: Nose normal.      Mouth/Throat:      Mouth: Mucous membranes are moist.   Eyes:      Extraocular Movements: Extraocular movements intact.      Pupils: Pupils are equal, round, and reactive to light.   Cardiovascular:      Rate and Rhythm: Normal rate and regular rhythm.      Heart sounds: No murmur heard.  Pulmonary:      Effort: Pulmonary effort is normal.      Breath sounds: Normal breath sounds.   Abdominal:      General: Abdomen is flat.      Palpations: Abdomen is soft.   Musculoskeletal:         General: No swelling or tenderness. " Normal range of motion.      Cervical back: Normal range of motion and neck supple.   Skin:     General: Skin is warm.      Capillary Refill: Capillary refill takes less than 2 seconds.   Neurological:      General: No focal deficit present.      Mental Status: He is alert and oriented to person, place, and time.   Psychiatric:         Mood and Affect: Mood normal.         Behavior: Behavior normal.         ED Course        Procedures              EKG Interpretation:      Interpreted by RACHEL Leal CNP  Time reviewed: 1356  Symptoms at time of EKG: syncope evaluation   Rhythm: normal sinus   Rate: normal  Ectopy: none  Conduction: normal  ST Segments/ T Waves: No ST-T wave changes  Comparison to prior: similar  Clinical Impression: normal EKG  Pending  EKG over read by internal medicine         Recent Results (from the past 24 hours)   EKG 12-lead, tracing only   Result Value Ref Range    Systolic Blood Pressure  mmHg    Diastolic Blood Pressure  mmHg    Ventricular Rate 72 BPM    Atrial Rate 72 BPM    HI Interval 178 ms    QRS Duration 108 ms     ms    QTc 433 ms    P Axis 38 degrees    R AXIS -10 degrees    T Axis 38 degrees    Interpretation ECG       Sinus rhythm  Poor anterior R-wave progression  Borderline ECG  When compared with ECG of 10-Jul-2024 08:58,  lateral st-t findings have normalized  Confirmed by MD JIMI, JANIS ([id number]) on 7/1/2025 4:05:06 PM     CBC with platelets differential    Narrative    The following orders were created for panel order CBC with platelets differential.  Procedure                               Abnormality         Status                     ---------                               -----------         ------                     CBC with platelets and ...[[id number]]  Abnormal            Final result                 Please view results for these tests on the individual orders.   Comprehensive metabolic panel   Result Value Ref Range    Sodium 139 135 - 145 mmol/L     Potassium 3.5 3.4 - 5.3 mmol/L    Carbon Dioxide (CO2) 20 (L) 22 - 29 mmol/L    Anion Gap 13 7 - 15 mmol/L    Urea Nitrogen 20.4 (H) 6.0 - 20.0 mg/dL    Creatinine 1.95 (H) 0.67 - 1.17 mg/dL    GFR Estimate 40 (L) >60 mL/min/1.73m2    Calcium 9.1 8.8 - 10.4 mg/dL    Chloride 106 98 - 107 mmol/L    Glucose 93 70 - 99 mg/dL    Alkaline Phosphatase 104 40 - 150 U/L    AST 53 (H) 0 - 45 U/L    ALT 52 0 - 70 U/L    Protein Total 7.6 6.4 - 8.3 g/dL    Albumin 4.0 3.5 - 5.2 g/dL    Bilirubin Total 1.1 <=1.2 mg/dL   Magnesium   Result Value Ref Range    Magnesium 1.3 (L) 1.7 - 2.3 mg/dL   Troponin T, High Sensitivity   Result Value Ref Range    Troponin T, High Sensitivity 39 (H) <=22 ng/L   Lactic acid whole blood with 1x repeat in 2 hr when >2   Result Value Ref Range    Lactic Acid, Initial 1.2 0.7 - 2.0 mmol/L   CBC with platelets and differential   Result Value Ref Range    WBC Count 7.4 4.0 - 11.0 10e3/uL    RBC Count 5.01 4.40 - 5.90 10e6/uL    Hemoglobin 14.7 13.3 - 17.7 g/dL    Hematocrit 43.3 40.0 - 53.0 %    MCV 86 78 - 100 fL    MCH 29.3 26.5 - 33.0 pg    MCHC 33.9 31.5 - 36.5 g/dL    RDW 15.3 (H) 10.0 - 15.0 %    Platelet Count 134 (L) 150 - 450 10e3/uL    % Neutrophils 66 %    % Lymphocytes 25 %    % Monocytes 8 %    % Eosinophils 1 %    % Basophils 0 %    % Immature Granulocytes 0 %    NRBCs per 100 WBC 0 <1 /100    Absolute Neutrophils 4.9 1.6 - 8.3 10e3/uL    Absolute Lymphocytes 1.8 0.8 - 5.3 10e3/uL    Absolute Monocytes 0.6 0.0 - 1.3 10e3/uL    Absolute Eosinophils 0.1 0.0 - 0.7 10e3/uL    Absolute Basophils 0.0 0.0 - 0.2 10e3/uL    Absolute Immature Granulocytes 0.0 <=0.4 10e3/uL    Absolute NRBCs 0.0 10e3/uL   XR Chest Port 1 View    Narrative    PROCEDURE:  XR CHEST PORT 1 VIEW    HISTORY:  syncope, chest pain.     COMPARISON:  CT 12/5/2024, chest x-ray 6/23/2021    FINDINGS:   The cardiac silhouette is normal in size. The pulmonary vasculature is  normal.  The lungs are clear. No pleural effusion or  pneumothorax.      Impression    IMPRESSION:  No acute cardiopulmonary disease.      THANIA PEREZ MD         SYSTEM ID:  [email or web address]   CT Head w/o Contrast    Narrative    PROCEDURE: CT HEAD W/O CONTRAST     HISTORY: syncope, hit head.    COMPARISON: 7/24/2020    TECHNIQUE: CT images were obtained from the skull base to the vertex.  Axial, coronal and sagittal images were reviewed.   Radiation dose for this scan was reduced using automated exposure  control, adjustment of the mA and/or kV according to patient size, or  iterative reconstruction technique.    FINDINGS: The ventricles and sulci are normal in size. No acute  intracranial hemorrhage, mass effect, or midline shift.    The grey-white matter interface is preserved. No evidence of acute  ischemia.    The calvarium is intact. The mastoid air cells are clear.  There is  partial opacification of the right frontal sinus.       Impression    IMPRESSION: No acute intracranial findings.      THANIA PEREZ MD         SYSTEM ID:  [email or web address]   CT Cervical Spine w/o Contrast    Narrative    PROCEDURE: CT CERVICAL SPINE W/O CONTRAST    HISTORY:  syncope; history of neck fusion     TECHNIQUE: Helical CT images of the cervical spine were obtained  without contrast. Coronal and sagittal reformats were provided.  Radiation dose for this scan was reduced using automated exposure  control, adjustment of the mA and/or kV according to patient size, or  iterative reconstruction technique.    COMPARISON: X-rays 12/9/2022    FINDINGS:     No acute fracture or subluxation is identified. There is normal  alignment and curvature. There is postoperative changes from anterior  C5-C7 spinal fusion with anterior plate and screws and disc spacer  devices. The surgical hardware is intact. The vertebral bodies are  maintained. Disc spaces are narrowed above the fusion with  degenerative endplate changes. There is multilevel facet arthrosis.      The prevertebral soft tissues are  unremarkable. The lung apices are  clear.      Impression    IMPRESSION:  No evidence of traumatic cervical spine injury.    THANIA PEREZ MD         SYSTEM ID:  [email or web address]   D dimer quantitative   Result Value Ref Range    D-Dimer Quantitative <0.27 0.00 - 0.50 ug/mL FEU    Narrative    This D-dimer assay is intended for use in conjunction with a clinical pretest probability assessment model to exclude pulmonary embolism (PE) and deep venous thrombosis (DVT) in outpatients suspected of PE or DVT. The cut-off value is 0.50 ug/mL FEU.    For patients 50 years of age or older, the application of age-adjusted cut-off values for D-Dimer may increase the specificity without significant effect on sensitivity. The literature suggested calculation age adjusted cut-off in ug/L = age in years x 10 ug/L. The results in this laboratory are reported as ug/mL rather than ug/L. The calculation for age adjusted cut off in ug/mL= age in years x 0.01 ug/mL. For example, the cut off for a 76 year old male is 76 x 0.01 ug/mL = 0.76 ug/mL (760 ug/L).    M Tony et al. Age adjusted D-dimer cut-off levels to rule out pulmonary embolism: The ADJUST-PE Study. KELLIE 2014;311:6062-3126.; HJ Jaylin et al. Diagnostic accuracy of conventional or age adjusted D-dimer cutoff values in older patients with suspected venous thromboembolism. Systemic review and meta-analysis. BMJ 2013:346:f2492.   Echocardiogram Complete   Result Value Ref Range    LVEF  60-65%     Narrative    [id number]  [id number]  [id number]  [id number]^LAWRENCE^BRENDEN^     [facility]  [address] [place] [address]     Name: CLAUDIA CONTRERAS  MRN: [id number]  : 1971  Study Date: 2025 02:52 PM  Age: 54 yrs  Gender: Male  Patient Location: [facility]  Reason For Study: Chest Pain, Syncope  Ordering Physician: BRENDEN BRAVO  Performed By: Fransisca Douglas     BSA: 2.2 m2  Height: 69 in  Weight: 239 lb  HR: 63  BP: 99/70  mmHg  ______________________________________________________________________________  Procedure  Echocardiogram with two-dimensional, color and spectral Doppler.  ______________________________________________________________________________  Interpretation Summary  Left ventricular size, wall motion and function are normal. The ejection  fraction is 60-65%.  Left ventricular diastolic function is normal.  Right ventricular function, chamber size, wall motion, and thickness are  normal.  No significant valvular abnormalities present.     This study was compared with the study from 7/2021 .No significant changes  noted.  ______________________________________________________________________________  Left Ventricle  Left ventricular size, wall motion and function are normal. The ejection  fraction is 60-65%. Left ventricular wall thickness is normal. Left  ventricular diastolic function is normal.     Right Ventricle  Right ventricular function, chamber size, wall motion, and thickness are  normal.     Atria  Both atria appear normal.     Mitral Valve  The mitral valve is normal.     Aortic Valve  The valve leaflets are not well visualized. On Doppler interrogation, there is  no significant stenosis or regurgitation.     Tricuspid Valve  The tricuspid valve is normal. Trace tricuspid insufficiency is present. The  right ventricular systolic pressure is approximated at 19.7 mmHg plus the  right atrial pressure.     Pulmonic Valve  On Doppler interrogation, there is no significant stenosis or regurgitation.     Vessels  The aorta root is normal. The thoracic aorta is normal. The inferior vena cava  was normal in size with preserved respiratory variability.     Pericardium  No pericardial effusion is present.     Miscellaneous  No significant valvular abnormalities present.     Compared to Previous Study  This study was compared with the study from 7/2021 . No significant  changes  noted.  ______________________________________________________________________________  MMode/2D Measurements & Calculations  IVSd: 1.1 cm  LVIDd: 4.2 cm  LVIDs: 2.9 cm  LVPWd: 1.1 cm  FS: 31.3 %  LV mass(C)d: 152.9 grams  LV mass(C)dI: 68.6 grams/m2  Ao root diam: 3.8 cm  asc Aorta Diam: 3.3 cm  LVOT diam: 2.2 cm  LVOT area: 3.8 cm2  Ao root diam index Ht(cm/m): 2.2  Ao root diam index BSA (cm/m2): 1.7  Asc Ao diam index BSA (cm/m2): 1.5  Asc Ao diam index Ht(cm/m): 1.9  LA Volume (BP): 43.2 ml     LA Volume Index (BP): 19.4 ml/m2  RWT: 0.51  TAPSE: 2.5 cm     Doppler Measurements & Calculations  MV E max santi: 63.4 cm/sec  MV A max santi: 72.0 cm/sec  MV E/A: 0.88  MV dec time: 0.25 sec  Ao V2 max: 106.0 cm/sec  Ao max P.0 mmHg  Ao V2 mean: 68.6 cm/sec  Ao mean P.0 mmHg  Ao V2 VTI: 18.9 cm  VIOLET(I,D): 3.3 cm2  VIOLET(V,D): 3.1 cm2  LV V1 max P.9 mmHg  LV V1 max: 85.3 cm/sec  LV V1 VTI: 16.6 cm  SV(LVOT): 63.1 ml  SI(LVOT): 28.3 ml/m2  PA acc time: 0.18 sec     TR max santi: 222.0 cm/sec  TR max P.7 mmHg  AV Santi Ratio (DI): 0.80  VIOLET Index (cm2/m2): 1.5     ______________________________________________________________________________  Report approved by: MARCIA JAMES MD on 2025 03:31 PM         Troponin T, High Sensitivity   Result Value Ref Range    Troponin T, High Sensitivity 32 (H) <=22 ng/L   UA with Microscopic reflex to Culture    Specimen: Urine, Clean Catch   Result Value Ref Range    Color Urine Yellow Colorless, Straw, Light Yellow, Yellow    Appearance Urine Clear Clear    Glucose Urine Negative Negative mg/dL    Bilirubin Urine Negative Negative    Ketones Urine Negative Negative mg/dL    Specific Gravity Urine 1.022 1.000 - 1.030    Blood Urine Negative Negative    pH Urine 5.0 5.0 - 9.0    Protein Albumin Urine 30 (A) Negative mg/dL    Urobilinogen Urine Normal Normal mg/dL    Nitrite Urine Negative Negative    Leukocyte Esterase Urine Negative Negative    Bacteria Urine Few  "(A) None Seen /HPF    Mucus Urine Present (A) None Seen /LPF    RBC Urine 1 <=2 /HPF    WBC Urine 1 <=5 /HPF    Hyaline Casts Urine 57 (H) <=2 /LPF    Narrative    Urine Culture not indicated       Medications   sodium chloride 0.9% BOLUS 1,000 mL (0 mLs Intravenous Stopped 7/1/25 1450)   magnesium sulfate 4 g in 100 mL sterile water intermittent infusion (0 g Intravenous Stopped 7/1/25 1723)   sodium chloride 0.9% BOLUS 1,000 mL (0 mLs Intravenous Stopped 7/1/25 1723)   sodium chloride 0.9% BOLUS 1,000 mL (0 mLs Intravenous Stopped 7/1/25 1956)       Assessments & Plan (with Medical Decision Making)  Christiano Metcalf is a 54 year old male who presents to the ER today via ambulance from home. Patient was carrying groceries when he became light headed, slumped over and had an LOC. Neighbor watched him fall. First responders found patient unresponsive, breathing, sweaty and clammy. He came to after 2-3 min and was alert, orientated. EMS found patient to be hypotensive, b/p 80/70, they gave an IVF bolus. Arrived alert, orientated. Blood sugar 117 on scene. EKG non-diagnostic.    Pt reports chest pain which he reports is \"there all the time and feels like gastric reflux\" and \"chronic diarrhea\".  Reports a slight headache. History neck fusion, no right sided neck pain today.    Denies seizure history. Denies drug, alcohol use.Denies shortness of breath. Denies recent illness, blood in stool.   Patient has a past medical history of learning disability, asthma, hypertension, obesity, type 2 diabetes  Patient is alert oriented nondistressed nontoxic very pleasant resting comfortably Belin cot.  When he arrived to the emergency department he was hypotensive 84/57, no tachycardia or arrhythmia on bedside monitor.  He is afebrile and otherwise vitally stable.  Vague complaints of epigastric pain which she believes is related to reflux.  No shortness of breath, mild headache.  No focal neurological deficits or complaints, " head is otherwise atraumatic.  Known deformity or spine tenderness.  No loss of bowel or bladder.  Syncope: Differential diagnosis includes but not limited to ACS, arrhythmia, anemia, orthostatic hypotension, dehydration, valvular disease, dissection, PE, hypoglycemia, seizure, anemia.    Neurological exam reassuring, head CT, cervical spine CT without any acute abnormalities or emergent findings.  Was no longer having chest pain upon arrival and did not experience chest pain throughout his stay, was observed for proximately 7 hours here, normal sinus rhythm without an acute arrhythmia on bedside monitor, EKG was normal sinus rhythm, troponin 39, 32.  Suspect elevation may be related to RINKU.  Echocardiogram stable and reassuring with a normal EF, no valve abnormalities.  Chest x-ray stable.  D-dimer reassuring and negative, no hypoxia or tachycardia to suggest acute pulmonary embolism.  CBC stable, platelet count 134, thrombocytopenia has been noted in the past  CMP: RINKU, BUN 20.4, creatinine 1.95, GFR 40.  AST 53, magnesium 1.3-replaced.  Blood sugar normal.  Lactic acid normal at 1.2  Urinalysis unremarkable  Patient was hypotensive upon arrival, was also positive orthostatic hypotension, we provided him with 3 L of IV fluid and his blood pressure did start to improve throughout the day to his normal baseline.  He was up ambulatory in the hallway without any symptoms no dizziness lightheadedness or chest pain.  He was feeling much better after interventions and had no other further complaints.  Suspect symptoms were related to orthostatic Hypotension, patient does have an RINKU, unclear cause, possible dehydration.  I did offer him further observation he politely declined which I think is reasonable, we did apply a Zio patch to help evaluate for arrhythmia, discussed tricked return precautions, we will have him hold his lisinopril due to the RINKU, and will have him follow-up closely in the clinic for reevaluation.   Patient's son was involved in patient's care today and he also will be spending time with his father and observing him tomorrow as well.     I have reviewed the nursing notes.    I have reviewed the findings, diagnosis, plan and need for follow up with the patient.    Medical Decision Making  The patient's presentation was of moderate complexity (syncope evaluation).    The patient's evaluation involved:  review of external note(s) from 1 sources (see separate area of note for details)  review of 3+ test result(s) ordered prior to this encounter (see separate area of note for details)  ordering and/or review of 3+ test(s) in this encounter (see separate area of note for details)    The patient's management necessitated moderate risk (prescription drug management including medications given in the ED).      New Prescriptions    No medications on file       Final diagnoses:   RINKU (acute kidney injury)   Orthostatic hypotension   Syncope       7/1/2025   [facility]       Fior Martel APRN CNP  07/01/25 2047       Fior Martel APRN CNP  07/01/25 2048     Pt. is a 21 yo M with a hx of DM c/o sore throat, headache and fever for several days.  Dad tested positive for Covid a few days ago.  Presents for Covid testing.  Took tylenol after having fever at home.  Denies blurry vision, rash, neck pain or neck stiffness, weakness or numbness.  Denies runny nose, coughing or trouble breathing.

## 2025-07-08 NOTE — ED PROVIDER NOTE - NSFOLLOWUPINSTRUCTIONS_ED_ALL_ED_FT
with the patient via the .     Future Appointments   Date Time Provider Department Center   7/10/2025  4:10 PM Darlyn Naidu, PT [facility]   10/7/2025  3:20 PM Dru Ball MD [facility]          intact Take ibuprofen 600mg every 6 hours.  Keep hand elevated, ice 3 times a day for 15 minutes.  Keep ace bandage in place during the day for the next 2 days.  OK to remove bandage when icing and at night.  See your doctor.    Contusion in Adults    WHAT YOU NEED TO KNOW:    A contusion is a bruise that appears on your skin after an injury. A bruise happens when small blood vessels tear but skin does not. When blood vessels tear, blood leaks into nearby tissue, such as soft tissue or muscle.    DISCHARGE INSTRUCTIONS:    Return to the emergency department if:     You have new trouble moving the injured area.      You have tingling or numbness in or near the injured area.      Your hand or foot below the bruise gets cold or turns pale.     Contact your healthcare provider if:     You find a new lump in the injured area.      Your symptoms do not improve with treatment after 4 to 5 days.      You have questions or concerns about your condition or care.    Medicines: You may need any of the following:     NSAIDs help decrease swelling and pain or fever. This medicine is available with or without a doctor's order. NSAIDs can cause stomach bleeding or kidney problems in certain people. If you take blood thinner medicine, always ask your healthcare provider if NSAIDs are safe for you. Always read the medicine label and follow directions.      Prescription pain medicine may be given. Do not wait until the pain is severe before you take your medicine.      Take your medicine as directed. Contact your healthcare provider if you think your medicine is not helping or if you have side effects. Tell him of her if you are allergic to any medicine. Keep a list of the medicines, vitamins, and herbs you take. Include the amounts, and when and why you take them. Bring the list or the pill bottles to follow-up visits. Carry your medicine list with you in case of an emergency.    Follow up with your healthcare provider as directed: You may need to return within a week to check your injury again. Write down your questions so you remember to ask them during your visits.    Help a contusion heal:     Rest the injured area or use it less than usual. If you bruised your leg or foot, you may need crutches or a cane to help you walk. This will help you keep weight off your injured body part.       Apply ice to decrease swelling and pain. Ice may also help prevent tissue damage. Use an ice pack, or put crushed ice in a plastic bag. Cover it with a towel and place it on your bruise for 15 to 20 minutes every hour or as directed.      Use compression to support the area and decrease swelling. Wrap an elastic bandage around the area over the bruised muscle. Make sure the bandage is not too tight. You should be able to fit 1 finger between the bandage and your skin.      Elevate (raise) your injured body part above the level of your heart to help decrease pain and swelling. Use pillows, blankets, or rolled towels to elevate the area as often as you can.      Do not drink alcohol as directed. Alcohol may slow healing.      Do not stretch injured muscles right after your injury. Ask your healthcare provider when and how you may safely stretch after your injury. Gentle stretches can help increase your flexibility.      Do not massage the area or put heating pads on the bruise right after your injury. Heat and massage may slow healing. Your healthcare provider may tell you to apply heat after several days. At that time, heat will start to help the injury heal.    Prevent another contusion:     Stretch and warm up before you play sports or exercise.      Wear protective gear when you play sports. Examples are shin guards and padding.       If you begin a new physical activity, start slowly to give your body a chance to adjust.

## 2025-07-26 ENCOUNTER — EMERGENCY (EMERGENCY)
Facility: HOSPITAL | Age: 25
LOS: 0 days | Discharge: HOME | End: 2025-07-26
Attending: EMERGENCY MEDICINE
Payer: COMMERCIAL

## 2025-07-26 VITALS
SYSTOLIC BLOOD PRESSURE: 134 MMHG | OXYGEN SATURATION: 100 % | HEART RATE: 95 BPM | TEMPERATURE: 98 F | DIASTOLIC BLOOD PRESSURE: 71 MMHG | WEIGHT: 200.84 LBS | RESPIRATION RATE: 18 BRPM

## 2025-07-26 VITALS
OXYGEN SATURATION: 100 % | SYSTOLIC BLOOD PRESSURE: 133 MMHG | DIASTOLIC BLOOD PRESSURE: 71 MMHG | TEMPERATURE: 98 F | HEART RATE: 76 BPM | RESPIRATION RATE: 17 BRPM

## 2025-07-26 DIAGNOSIS — J02.9 ACUTE PHARYNGITIS, UNSPECIFIED: ICD-10-CM

## 2025-07-26 DIAGNOSIS — E10.65 TYPE 1 DIABETES MELLITUS WITH HYPERGLYCEMIA: ICD-10-CM

## 2025-07-26 DIAGNOSIS — I88.9 NONSPECIFIC LYMPHADENITIS, UNSPECIFIED: ICD-10-CM

## 2025-07-26 DIAGNOSIS — E87.1 HYPO-OSMOLALITY AND HYPONATREMIA: ICD-10-CM

## 2025-07-26 DIAGNOSIS — R79.89 OTHER SPECIFIED ABNORMAL FINDINGS OF BLOOD CHEMISTRY: ICD-10-CM

## 2025-07-26 DIAGNOSIS — J03.90 ACUTE TONSILLITIS, UNSPECIFIED: ICD-10-CM

## 2025-07-26 LAB
ALBUMIN SERPL ELPH-MCNC: 3.8 G/DL — SIGNIFICANT CHANGE UP (ref 3.3–5)
ALP SERPL-CCNC: 151 U/L — HIGH (ref 40–120)
ALT FLD-CCNC: 14 U/L — SIGNIFICANT CHANGE UP (ref 12–78)
ANION GAP SERPL CALC-SCNC: 8 MMOL/L — SIGNIFICANT CHANGE UP (ref 5–17)
AST SERPL-CCNC: 10 U/L — LOW (ref 15–37)
BASOPHILS # BLD AUTO: 0.01 K/UL — SIGNIFICANT CHANGE UP (ref 0–0.2)
BASOPHILS NFR BLD AUTO: 0.1 % — SIGNIFICANT CHANGE UP (ref 0–2)
BILIRUB SERPL-MCNC: 0.6 MG/DL — SIGNIFICANT CHANGE UP (ref 0.2–1.2)
BUN SERPL-MCNC: 20 MG/DL — SIGNIFICANT CHANGE UP (ref 7–23)
CALCIUM SERPL-MCNC: 10 MG/DL — SIGNIFICANT CHANGE UP (ref 8.5–10.1)
CHLORIDE SERPL-SCNC: 99 MMOL/L — SIGNIFICANT CHANGE UP (ref 96–108)
CO2 SERPL-SCNC: 23 MMOL/L — SIGNIFICANT CHANGE UP (ref 22–31)
CREAT SERPL-MCNC: 1.36 MG/DL — HIGH (ref 0.5–1.3)
EGFR: 74 ML/MIN/1.73M2 — SIGNIFICANT CHANGE UP
EGFR: 74 ML/MIN/1.73M2 — SIGNIFICANT CHANGE UP
EOSINOPHIL # BLD AUTO: 0 K/UL — SIGNIFICANT CHANGE UP (ref 0–0.5)
EOSINOPHIL NFR BLD AUTO: 0 % — SIGNIFICANT CHANGE UP (ref 0–6)
GLUCOSE BLDC GLUCOMTR-MCNC: 333 MG/DL — HIGH (ref 70–99)
GLUCOSE BLDC GLUCOMTR-MCNC: 377 MG/DL — HIGH (ref 70–99)
GLUCOSE SERPL-MCNC: 446 MG/DL — HIGH (ref 70–99)
HCT VFR BLD CALC: 47.7 % — SIGNIFICANT CHANGE UP (ref 39–50)
HGB BLD-MCNC: 16.5 G/DL — SIGNIFICANT CHANGE UP (ref 13–17)
IMM GRANULOCYTES # BLD AUTO: 0.04 K/UL — SIGNIFICANT CHANGE UP (ref 0–0.07)
IMM GRANULOCYTES NFR BLD AUTO: 0.4 % — SIGNIFICANT CHANGE UP (ref 0–0.9)
LYMPHOCYTES # BLD AUTO: 0.93 K/UL — LOW (ref 1–3.3)
LYMPHOCYTES NFR BLD AUTO: 10.2 % — LOW (ref 13–44)
MCHC RBC-ENTMCNC: 32 PG — SIGNIFICANT CHANGE UP (ref 27–34)
MCHC RBC-ENTMCNC: 34.6 G/DL — SIGNIFICANT CHANGE UP (ref 32–36)
MCV RBC AUTO: 92.4 FL — SIGNIFICANT CHANGE UP (ref 80–100)
MONOCYTES # BLD AUTO: 0.16 K/UL — SIGNIFICANT CHANGE UP (ref 0–0.9)
MONOCYTES NFR BLD AUTO: 1.8 % — LOW (ref 2–14)
NEUTROPHILS # BLD AUTO: 7.96 K/UL — HIGH (ref 1.8–7.4)
NEUTROPHILS NFR BLD AUTO: 87.5 % — HIGH (ref 43–77)
NRBC # BLD AUTO: 0 K/UL — SIGNIFICANT CHANGE UP (ref 0–0)
NRBC # FLD: 0 K/UL — SIGNIFICANT CHANGE UP (ref 0–0)
NRBC BLD AUTO-RTO: 0 /100 WBCS — SIGNIFICANT CHANGE UP (ref 0–0)
PLATELET # BLD AUTO: 291 K/UL — SIGNIFICANT CHANGE UP (ref 150–400)
PMV BLD: 10.1 FL — SIGNIFICANT CHANGE UP (ref 7–13)
POTASSIUM SERPL-MCNC: 4.9 MMOL/L — SIGNIFICANT CHANGE UP (ref 3.5–5.3)
POTASSIUM SERPL-SCNC: 4.9 MMOL/L — SIGNIFICANT CHANGE UP (ref 3.5–5.3)
PROT SERPL-MCNC: 8.3 GM/DL — SIGNIFICANT CHANGE UP (ref 6–8.3)
RBC # BLD: 5.16 M/UL — SIGNIFICANT CHANGE UP (ref 4.2–5.8)
RBC # FLD: 11.4 % — SIGNIFICANT CHANGE UP (ref 10.3–14.5)
SODIUM SERPL-SCNC: 130 MMOL/L — LOW (ref 135–145)
WBC # BLD: 9.1 K/UL — SIGNIFICANT CHANGE UP (ref 3.8–10.5)
WBC # FLD AUTO: 9.1 K/UL — SIGNIFICANT CHANGE UP (ref 3.8–10.5)

## 2025-07-26 PROCEDURE — 85025 COMPLETE CBC W/AUTO DIFF WBC: CPT

## 2025-07-26 PROCEDURE — 96374 THER/PROPH/DIAG INJ IV PUSH: CPT | Mod: XU

## 2025-07-26 PROCEDURE — 82962 GLUCOSE BLOOD TEST: CPT

## 2025-07-26 PROCEDURE — 70491 CT SOFT TISSUE NECK W/DYE: CPT | Mod: 26

## 2025-07-26 PROCEDURE — 87040 BLOOD CULTURE FOR BACTERIA: CPT

## 2025-07-26 PROCEDURE — 96372 THER/PROPH/DIAG INJ SC/IM: CPT | Mod: XU

## 2025-07-26 PROCEDURE — 70491 CT SOFT TISSUE NECK W/DYE: CPT

## 2025-07-26 PROCEDURE — 36415 COLL VENOUS BLD VENIPUNCTURE: CPT

## 2025-07-26 PROCEDURE — 99284 EMERGENCY DEPT VISIT MOD MDM: CPT | Mod: 25

## 2025-07-26 PROCEDURE — 99285 EMERGENCY DEPT VISIT HI MDM: CPT

## 2025-07-26 PROCEDURE — 80053 COMPREHEN METABOLIC PANEL: CPT

## 2025-07-26 PROCEDURE — 96375 TX/PRO/DX INJ NEW DRUG ADDON: CPT | Mod: XU

## 2025-07-26 RX ORDER — IBUPROFEN 200 MG
1 TABLET ORAL
Qty: 28 | Refills: 0
Start: 2025-07-26 | End: 2025-08-01

## 2025-07-26 RX ORDER — DEXAMETHASONE 0.5 MG/1
6 TABLET ORAL ONCE
Refills: 0 | Status: COMPLETED | OUTPATIENT
Start: 2025-07-26 | End: 2025-07-26

## 2025-07-26 RX ORDER — AMOXICILLIN AND CLAVULANATE POTASSIUM 500; 125 MG/1; MG/1
1 TABLET, FILM COATED ORAL
Qty: 20 | Refills: 0
Start: 2025-07-26 | End: 2025-08-04

## 2025-07-26 RX ORDER — KETOROLAC TROMETHAMINE 30 MG/ML
30 INJECTION, SOLUTION INTRAMUSCULAR; INTRAVENOUS ONCE
Refills: 0 | Status: DISCONTINUED | OUTPATIENT
Start: 2025-07-26 | End: 2025-07-26

## 2025-07-26 RX ORDER — AMPICILLIN SODIUM AND SULBACTAM SODIUM 1; .5 G/1; G/1
3 INJECTION, POWDER, FOR SOLUTION INTRAMUSCULAR; INTRAVENOUS ONCE
Refills: 0 | Status: COMPLETED | OUTPATIENT
Start: 2025-07-26 | End: 2025-07-26

## 2025-07-26 RX ADMIN — Medication 1000 MILLILITER(S): at 20:08

## 2025-07-26 RX ADMIN — Medication 8 UNIT(S): at 21:06

## 2025-07-26 RX ADMIN — AMPICILLIN SODIUM AND SULBACTAM SODIUM 200 GRAM(S): 1; .5 INJECTION, POWDER, FOR SOLUTION INTRAMUSCULAR; INTRAVENOUS at 20:27

## 2025-07-26 RX ADMIN — Medication 1000 MILLILITER(S): at 21:08

## 2025-07-26 RX ADMIN — KETOROLAC TROMETHAMINE 30 MILLIGRAM(S): 30 INJECTION, SOLUTION INTRAMUSCULAR; INTRAVENOUS at 20:08

## 2025-07-26 RX ADMIN — DEXAMETHASONE 6 MILLIGRAM(S): 0.5 TABLET ORAL at 20:08

## 2025-08-01 LAB
CULTURE RESULTS: SIGNIFICANT CHANGE UP
CULTURE RESULTS: SIGNIFICANT CHANGE UP
SPECIMEN SOURCE: SIGNIFICANT CHANGE UP
SPECIMEN SOURCE: SIGNIFICANT CHANGE UP